# Patient Record
Sex: FEMALE
[De-identification: names, ages, dates, MRNs, and addresses within clinical notes are randomized per-mention and may not be internally consistent; named-entity substitution may affect disease eponyms.]

---

## 2019-11-17 ENCOUNTER — TRANSCRIPTION ENCOUNTER (OUTPATIENT)
Age: 39
End: 2019-11-17

## 2019-12-16 ENCOUNTER — TRANSCRIPTION ENCOUNTER (OUTPATIENT)
Age: 39
End: 2019-12-16

## 2021-05-22 ENCOUNTER — TRANSCRIPTION ENCOUNTER (OUTPATIENT)
Age: 41
End: 2021-05-22

## 2024-09-02 ENCOUNTER — APPOINTMENT (OUTPATIENT)
Dept: ORTHOPEDIC SURGERY | Facility: CLINIC | Age: 44
End: 2024-09-02
Payer: COMMERCIAL

## 2024-09-02 ENCOUNTER — RESULT CHARGE (OUTPATIENT)
Age: 44
End: 2024-09-02

## 2024-09-02 DIAGNOSIS — Z78.9 OTHER SPECIFIED HEALTH STATUS: ICD-10-CM

## 2024-09-02 DIAGNOSIS — M54.41 LUMBAGO WITH SCIATICA, RIGHT SIDE: ICD-10-CM

## 2024-09-02 DIAGNOSIS — Z00.00 ENCOUNTER FOR GENERAL ADULT MEDICAL EXAMINATION W/OUT ABNORMAL FINDINGS: ICD-10-CM

## 2024-09-02 PROCEDURE — 99203 OFFICE O/P NEW LOW 30 MIN: CPT

## 2024-09-02 PROCEDURE — 72100 X-RAY EXAM L-S SPINE 2/3 VWS: CPT

## 2024-09-02 RX ORDER — PHENTERMINE HYDROCHLORIDE 37.5 MG/1
37.5 CAPSULE ORAL
Qty: 30 | Refills: 0 | Status: ACTIVE | COMMUNITY
Start: 2024-08-20

## 2024-09-02 RX ORDER — PREDNISONE 20 MG/1
20 TABLET ORAL
Qty: 18 | Refills: 0 | Status: ACTIVE | COMMUNITY
Start: 2024-09-02 | End: 1900-01-01

## 2024-09-02 RX ORDER — CYCLOBENZAPRINE HYDROCHLORIDE 5 MG/1
5 TABLET, FILM COATED ORAL 3 TIMES DAILY
Qty: 30 | Refills: 3 | Status: ACTIVE | COMMUNITY
Start: 2024-09-02 | End: 1900-01-01

## 2024-09-02 NOTE — ASSESSMENT
[FreeTextEntry1] : The patient was advised of the diagnosis. The natural history of the pathology was explained in full to the patient in layman's terms. All questions were answered. The risks and benefits of surgical and non-surgical treatment alternatives were explained in full to the patient.  pt provided Prednisone taper pack as well as Flexeril 5mg tid x 10 days. PT rx provided. recommend no more spinal "manipulations" with chiropractor. RTO in 1-2 weeks for f/u care. possibility of MRI discussed.

## 2024-09-02 NOTE — HISTORY OF PRESENT ILLNESS
[de-identified] : 09/02/2024: Patient reports she has been having pain in the lower back July 20th states she tried to go to a chiropractor which helped.  pain radiates to the right ankle. pt states she returned back from [place] and started resting which helped.   Pt states pain returned 1 week ago. pt denies associated weakness or b/b dysfunction.  PMH: denied Allergies: NKDA   [FreeTextEntry1] : lower back pain

## 2024-09-02 NOTE — IMAGING
[de-identified] : Thoracic / Lumbar spine visually: Gait examination: wnls Pt is able to heel and toe walk. Shopping Cart Sign: normal TTP: none Percussion of T/L spine: negative ROM: full with mild discomfort Facet Joint Compression Tests: negative Sacroiliac joints: nttp Bilateral hip ROM: full and pain free Sensation: wnls symmetrically. DTRs: wnls symmetrically. Clonus: wnls symmetrically. Babinski Test: wnls symmetrically. Seated SLR Test: + to the right   Abdomen: soft / supple and nttp Recumbent SLR test: + right Contralateral SLR test: negative Pelvic compression: negative ALEA Test: negative Femoral Stretch Testing: negative..  Lumbar spine 2 view xray: right sacral ala / L5 transverse process DJD noted / no acute pathology

## 2024-10-14 ENCOUNTER — APPOINTMENT (OUTPATIENT)
Dept: ORTHOPEDIC SURGERY | Facility: CLINIC | Age: 44
End: 2024-10-14
Payer: COMMERCIAL

## 2024-10-14 VITALS — HEIGHT: 66 IN | WEIGHT: 185 LBS | BODY MASS INDEX: 29.73 KG/M2

## 2024-10-14 DIAGNOSIS — M75.31 CALCIFIC TENDINITIS OF RIGHT SHOULDER: ICD-10-CM

## 2024-10-14 PROCEDURE — 73030 X-RAY EXAM OF SHOULDER: CPT | Mod: RT

## 2024-10-14 PROCEDURE — 73010 X-RAY EXAM OF SHOULDER BLADE: CPT | Mod: RT

## 2024-10-14 PROCEDURE — 99204 OFFICE O/P NEW MOD 45 MIN: CPT

## 2024-10-14 RX ORDER — METHYLPREDNISOLONE 4 MG/1
4 TABLET ORAL
Qty: 1 | Refills: 0 | Status: ACTIVE | COMMUNITY
Start: 2024-10-14 | End: 1900-01-01

## 2024-10-17 ENCOUNTER — RESULT REVIEW (OUTPATIENT)
Age: 44
End: 2024-10-17

## 2024-11-13 ENCOUNTER — APPOINTMENT (OUTPATIENT)
Dept: ORTHOPEDIC SURGERY | Facility: CLINIC | Age: 44
End: 2024-11-13
Payer: COMMERCIAL

## 2024-11-13 DIAGNOSIS — M75.31 CALCIFIC TENDINITIS OF RIGHT SHOULDER: ICD-10-CM

## 2024-11-13 PROCEDURE — 99213 OFFICE O/P EST LOW 20 MIN: CPT

## 2024-11-13 RX ORDER — METHYLPREDNISOLONE 4 MG/1
4 TABLET ORAL
Qty: 1 | Refills: 0 | Status: ACTIVE | COMMUNITY
Start: 2024-11-13 | End: 1900-01-01

## 2024-11-14 ENCOUNTER — APPOINTMENT (OUTPATIENT)
Dept: MRI IMAGING | Facility: CLINIC | Age: 44
End: 2024-11-14
Payer: COMMERCIAL

## 2024-11-14 PROCEDURE — 73221 MRI JOINT UPR EXTREM W/O DYE: CPT | Mod: RT

## 2024-11-14 PROCEDURE — 72156 MRI NECK SPINE W/O & W/DYE: CPT

## 2024-11-25 ENCOUNTER — APPOINTMENT (OUTPATIENT)
Dept: ORTHOPEDIC SURGERY | Facility: CLINIC | Age: 44
End: 2024-11-25
Payer: COMMERCIAL

## 2024-11-25 VITALS — HEIGHT: 66 IN | BODY MASS INDEX: 29.73 KG/M2 | WEIGHT: 185 LBS

## 2024-11-25 DIAGNOSIS — M75.31 CALCIFIC TENDINITIS OF RIGHT SHOULDER: ICD-10-CM

## 2024-11-25 PROCEDURE — 73030 X-RAY EXAM OF SHOULDER: CPT | Mod: RT

## 2024-11-25 PROCEDURE — 99214 OFFICE O/P EST MOD 30 MIN: CPT

## 2024-11-25 PROCEDURE — 73010 X-RAY EXAM OF SHOULDER BLADE: CPT | Mod: RT

## 2024-12-03 RX ORDER — MELOXICAM 15 MG/1
15 TABLET ORAL DAILY
Qty: 40 | Refills: 0 | Status: ACTIVE | COMMUNITY
Start: 2024-12-03 | End: 1900-01-01

## 2024-12-04 ENCOUNTER — OFFICE (OUTPATIENT)
Dept: URBAN - METROPOLITAN AREA CLINIC 12 | Facility: CLINIC | Age: 44
Setting detail: OPHTHALMOLOGY
End: 2024-12-04
Payer: COMMERCIAL

## 2024-12-04 DIAGNOSIS — H25.13: ICD-10-CM

## 2024-12-04 PROCEDURE — 92004 COMPRE OPH EXAM NEW PT 1/>: CPT | Performed by: OPHTHALMOLOGY

## 2024-12-04 ASSESSMENT — KERATOMETRY
OD_AXISANGLE_DEGREES: 26
OS_K1POWER_DIOPTERS: 41.25
OD_K2POWER_DIOPTERS: 41.50
OS_AXISANGLE_DEGREES: 171
OD_K1POWER_DIOPTERS: 40.75
OS_K2POWER_DIOPTERS: 41.75

## 2024-12-04 ASSESSMENT — REFRACTION_AUTOREFRACTION
OS_AXIS: 75
OS_SPHERE: PLANO
OD_AXIS: 118
OD_CYLINDER: -0.50
OD_SPHERE: +0.25
OS_CYLINDER: -0.50

## 2024-12-04 ASSESSMENT — REFRACTION_MANIFEST
OD_VA1: 20/20
OD_SPHERE: +0.25
OS_AXIS: 75
OD_CYLINDER: -0.50
OS_CYLINDER: -0.50
OD_AXIS: 118
OS_SPHERE: PLANO

## 2024-12-04 ASSESSMENT — TONOMETRY
OS_IOP_MMHG: 14
OD_IOP_MMHG: 15

## 2024-12-04 ASSESSMENT — VISUAL ACUITY
OD_BCVA: 20/20
OS_BCVA: 20/20-2

## 2024-12-04 ASSESSMENT — CONFRONTATIONAL VISUAL FIELD TEST (CVF)
OS_FINDINGS: FULL
OD_FINDINGS: FULL

## 2025-06-06 ENCOUNTER — APPOINTMENT (OUTPATIENT)
Dept: ORTHOPEDIC SURGERY | Facility: CLINIC | Age: 45
End: 2025-06-06
Payer: COMMERCIAL

## 2025-06-06 VITALS — BODY MASS INDEX: 29.73 KG/M2 | HEIGHT: 66 IN | WEIGHT: 185 LBS

## 2025-06-06 PROCEDURE — 99213 OFFICE O/P EST LOW 20 MIN: CPT

## 2025-06-06 RX ORDER — METHYLPREDNISOLONE 4 MG/1
4 TABLET ORAL
Qty: 1 | Refills: 1 | Status: ACTIVE | COMMUNITY
Start: 2025-06-06 | End: 1900-01-01

## 2025-06-06 RX ORDER — CYCLOBENZAPRINE HYDROCHLORIDE 5 MG/1
5 TABLET, FILM COATED ORAL 3 TIMES DAILY
Qty: 30 | Refills: 2 | Status: ACTIVE | COMMUNITY
Start: 2025-06-06 | End: 1900-01-01

## 2025-06-10 ENCOUNTER — APPOINTMENT (OUTPATIENT)
Dept: PAIN MANAGEMENT | Facility: CLINIC | Age: 45
End: 2025-06-10

## 2025-06-10 VITALS — BODY MASS INDEX: 30.05 KG/M2 | HEIGHT: 66 IN | WEIGHT: 187 LBS

## 2025-06-10 PROBLEM — M47.816 LUMBAR SPONDYLOSIS: Status: ACTIVE | Noted: 2025-06-07

## 2025-06-10 PROCEDURE — 99204 OFFICE O/P NEW MOD 45 MIN: CPT

## 2025-06-10 RX ORDER — GABAPENTIN 300 MG/1
300 CAPSULE ORAL
Qty: 30 | Refills: 2 | Status: ACTIVE | COMMUNITY
Start: 2025-06-10 | End: 1900-01-01

## 2025-06-14 ENCOUNTER — APPOINTMENT (OUTPATIENT)
Dept: MRI IMAGING | Facility: CLINIC | Age: 45
End: 2025-06-14
Payer: COMMERCIAL

## 2025-06-14 PROCEDURE — 72148 MRI LUMBAR SPINE W/O DYE: CPT

## 2025-06-17 ENCOUNTER — APPOINTMENT (OUTPATIENT)
Dept: PAIN MANAGEMENT | Facility: CLINIC | Age: 45
End: 2025-06-17

## 2025-06-17 VITALS — WEIGHT: 189 LBS | HEIGHT: 66 IN | BODY MASS INDEX: 30.37 KG/M2

## 2025-06-17 PROCEDURE — 99213 OFFICE O/P EST LOW 20 MIN: CPT

## 2025-06-18 ENCOUNTER — APPOINTMENT (OUTPATIENT)
Dept: ORTHOPEDIC SURGERY | Facility: CLINIC | Age: 45
End: 2025-06-18

## 2025-06-19 ENCOUNTER — APPOINTMENT (OUTPATIENT)
Dept: PAIN MANAGEMENT | Facility: CLINIC | Age: 45
End: 2025-06-19
Payer: COMMERCIAL

## 2025-06-19 ENCOUNTER — APPOINTMENT (OUTPATIENT)
Dept: NEUROSURGERY | Facility: CLINIC | Age: 45
End: 2025-06-19

## 2025-06-19 PROCEDURE — 64484 NJX AA&/STRD TFRM EPI L/S EA: CPT | Mod: RT,59

## 2025-06-19 PROCEDURE — 64483 NJX AA&/STRD TFRM EPI L/S 1: CPT | Mod: RT

## 2025-06-19 PROCEDURE — J3490M: CUSTOM

## 2025-06-20 ENCOUNTER — APPOINTMENT (OUTPATIENT)
Dept: PAIN MANAGEMENT | Facility: CLINIC | Age: 45
End: 2025-06-20

## 2025-06-30 ENCOUNTER — APPOINTMENT (OUTPATIENT)
Dept: PAIN MANAGEMENT | Facility: CLINIC | Age: 45
End: 2025-06-30

## 2025-06-30 VITALS — HEIGHT: 66 IN | BODY MASS INDEX: 32.14 KG/M2 | WEIGHT: 200 LBS

## 2025-06-30 PROBLEM — M48.07 LUMBOSACRAL SPINAL STENOSIS: Status: ACTIVE | Noted: 2025-06-10

## 2025-06-30 PROCEDURE — J3490M: CUSTOM

## 2025-06-30 PROCEDURE — 20552 NJX 1/MLT TRIGGER POINT 1/2: CPT

## 2025-06-30 PROCEDURE — 99214 OFFICE O/P EST MOD 30 MIN: CPT | Mod: 25

## 2025-07-08 ENCOUNTER — APPOINTMENT (OUTPATIENT)
Dept: PAIN MANAGEMENT | Facility: CLINIC | Age: 45
End: 2025-07-08
Payer: COMMERCIAL

## 2025-07-08 PROCEDURE — 62323 NJX INTERLAMINAR LMBR/SAC: CPT

## 2025-07-10 ENCOUNTER — APPOINTMENT (OUTPATIENT)
Dept: PAIN MANAGEMENT | Facility: CLINIC | Age: 45
End: 2025-07-10

## 2025-07-21 ENCOUNTER — APPOINTMENT (OUTPATIENT)
Dept: PAIN MANAGEMENT | Facility: CLINIC | Age: 45
End: 2025-07-21
Payer: COMMERCIAL

## 2025-07-21 VITALS — WEIGHT: 200 LBS | BODY MASS INDEX: 32.14 KG/M2 | HEIGHT: 66 IN

## 2025-07-21 DIAGNOSIS — M54.17 RADICULOPATHY, LUMBOSACRAL REGION: ICD-10-CM

## 2025-07-21 DIAGNOSIS — M54.50 LOW BACK PAIN, UNSPECIFIED: ICD-10-CM

## 2025-07-21 PROCEDURE — 99213 OFFICE O/P EST LOW 20 MIN: CPT

## 2025-07-25 ENCOUNTER — APPOINTMENT (OUTPATIENT)
Dept: PAIN MANAGEMENT | Facility: CLINIC | Age: 45
End: 2025-07-25

## 2025-08-07 ENCOUNTER — APPOINTMENT (OUTPATIENT)
Dept: NEUROSURGERY | Facility: CLINIC | Age: 45
End: 2025-08-07

## 2025-08-07 VITALS
HEIGHT: 66 IN | WEIGHT: 200 LBS | DIASTOLIC BLOOD PRESSURE: 78 MMHG | HEART RATE: 72 BPM | OXYGEN SATURATION: 97 % | RESPIRATION RATE: 16 BRPM | BODY MASS INDEX: 32.14 KG/M2 | SYSTOLIC BLOOD PRESSURE: 120 MMHG

## 2025-08-07 PROCEDURE — 99203 OFFICE O/P NEW LOW 30 MIN: CPT
